# Patient Record
Sex: MALE | Race: WHITE | NOT HISPANIC OR LATINO | URBAN - METROPOLITAN AREA
[De-identification: names, ages, dates, MRNs, and addresses within clinical notes are randomized per-mention and may not be internally consistent; named-entity substitution may affect disease eponyms.]

---

## 2023-03-27 LAB
ALANINE AMINOTRANSFERASE (SGPT) (U/L) IN SER/PLAS: 43 U/L (ref 10–52)
ALBUMIN (G/DL) IN SER/PLAS: 4.3 G/DL (ref 3.4–5)
ALBUMIN (G/DL) IN SER/PLAS: 4.4 G/DL (ref 3.4–5)
ALKALINE PHOSPHATASE (U/L) IN SER/PLAS: 84 U/L (ref 33–120)
ANION GAP IN SER/PLAS: 15 MMOL/L (ref 10–20)
ASPARTATE AMINOTRANSFERASE (SGOT) (U/L) IN SER/PLAS: 25 U/L (ref 9–39)
BILIRUBIN DIRECT (MG/DL) IN SER/PLAS: 0.1 MG/DL (ref 0–0.3)
BILIRUBIN TOTAL (MG/DL) IN SER/PLAS: 0.5 MG/DL (ref 0–1.2)
CALCIUM (MG/DL) IN SER/PLAS: 9.4 MG/DL (ref 8.6–10.6)
CARBON DIOXIDE, TOTAL (MMOL/L) IN SER/PLAS: 26 MMOL/L (ref 21–32)
CHLORIDE (MMOL/L) IN SER/PLAS: 102 MMOL/L (ref 98–107)
CHOLESTEROL (MG/DL) IN SER/PLAS: 213 MG/DL (ref 0–199)
CHOLESTEROL IN HDL (MG/DL) IN SER/PLAS: 52.3 MG/DL
CHOLESTEROL/HDL RATIO: 4.1
CREATININE (MG/DL) IN SER/PLAS: 1.17 MG/DL (ref 0.5–1.3)
CREATININE (MG/DL) IN URINE: 82.7 MG/DL (ref 20–370)
ERYTHROCYTE DISTRIBUTION WIDTH (RATIO) BY AUTOMATED COUNT: 12.2 % (ref 11.5–14.5)
ERYTHROCYTE MEAN CORPUSCULAR HEMOGLOBIN CONCENTRATION (G/DL) BY AUTOMATED: 31.9 G/DL (ref 32–36)
ERYTHROCYTE MEAN CORPUSCULAR VOLUME (FL) BY AUTOMATED COUNT: 96 FL (ref 80–100)
ERYTHROCYTES (10*6/UL) IN BLOOD BY AUTOMATED COUNT: 4.98 X10E12/L (ref 4.5–5.9)
GFR MALE: 76 ML/MIN/1.73M2
GLUCOSE (MG/DL) IN SER/PLAS: 127 MG/DL (ref 74–99)
HEMATOCRIT (%) IN BLOOD BY AUTOMATED COUNT: 48 % (ref 41–52)
HEMOGLOBIN (G/DL) IN BLOOD: 15.3 G/DL (ref 13.5–17.5)
LDL: 132 MG/DL (ref 0–99)
LEUKOCYTES (10*3/UL) IN BLOOD BY AUTOMATED COUNT: 7.8 X10E9/L (ref 4.4–11.3)
MAGNESIUM (MG/DL) IN SER/PLAS: 1.83 MG/DL (ref 1.6–2.4)
NRBC (PER 100 WBCS) BY AUTOMATED COUNT: 0 /100 WBC (ref 0–0)
PHOSPHATE (MG/DL) IN SER/PLAS: 2.5 MG/DL (ref 2.5–4.9)
PLATELETS (10*3/UL) IN BLOOD AUTOMATED COUNT: 185 X10E9/L (ref 150–450)
POTASSIUM (MMOL/L) IN SER/PLAS: 4.8 MMOL/L (ref 3.5–5.3)
PROSTATE SPECIFIC AG (NG/ML) IN SER/PLAS: 0.64 NG/ML (ref 0–4)
PROTEIN (MG/DL) IN URINE: 11 MG/DL (ref 5–25)
PROTEIN TOTAL: 6.9 G/DL (ref 6.4–8.2)
PROTEIN/CREATININE (MG/MG) IN URINE: 0.13 MG/MG CREAT (ref 0–0.17)
SODIUM (MMOL/L) IN SER/PLAS: 138 MMOL/L (ref 136–145)
TACROLIMUS (NG/ML) IN BLOOD: 4.8 NG/ML (ref 2–15)
TRIGLYCERIDE (MG/DL) IN SER/PLAS: 146 MG/DL (ref 0–149)
UREA NITROGEN (MG/DL) IN SER/PLAS: 18 MG/DL (ref 6–23)
VLDL: 29 MG/DL (ref 0–40)

## 2023-10-13 ENCOUNTER — LAB (OUTPATIENT)
Dept: LAB | Facility: LAB | Age: 48
End: 2023-10-13
Payer: COMMERCIAL

## 2023-10-13 DIAGNOSIS — D84.9 IMMUNODEFICIENCY, UNSPECIFIED (MULTI): Primary | ICD-10-CM

## 2023-10-13 DIAGNOSIS — D84.9 IMMUNODEFICIENCY, UNSPECIFIED (MULTI): ICD-10-CM

## 2023-10-13 LAB
ALBUMIN SERPL BCP-MCNC: 4.2 G/DL (ref 3.4–5)
ANION GAP SERPL CALC-SCNC: 12 MMOL/L (ref 10–20)
BUN SERPL-MCNC: 21 MG/DL (ref 6–23)
CALCIUM SERPL-MCNC: 9.7 MG/DL (ref 8.6–10.6)
CHLORIDE SERPL-SCNC: 102 MMOL/L (ref 98–107)
CO2 SERPL-SCNC: 29 MMOL/L (ref 21–32)
CREAT SERPL-MCNC: 1.21 MG/DL (ref 0.5–1.3)
CREAT UR-MCNC: 105.7 MG/DL (ref 20–370)
GFR SERPL CREATININE-BSD FRML MDRD: 73 ML/MIN/1.73M*2
GLUCOSE SERPL-MCNC: 138 MG/DL (ref 74–99)
MAGNESIUM SERPL-MCNC: 1.79 MG/DL (ref 1.6–2.4)
PHOSPHATE SERPL-MCNC: 3.1 MG/DL (ref 2.5–4.9)
POTASSIUM SERPL-SCNC: 5.5 MMOL/L (ref 3.5–5.3)
SODIUM SERPL-SCNC: 137 MMOL/L (ref 136–145)
TACROLIMUS BLD-MCNC: 5.2 NG/ML

## 2023-10-13 PROCEDURE — 82570 ASSAY OF URINE CREATININE: CPT

## 2023-10-13 PROCEDURE — 80069 RENAL FUNCTION PANEL: CPT

## 2023-10-13 PROCEDURE — 80197 ASSAY OF TACROLIMUS: CPT

## 2023-10-13 PROCEDURE — 83735 ASSAY OF MAGNESIUM: CPT

## 2023-10-13 PROCEDURE — 36415 COLL VENOUS BLD VENIPUNCTURE: CPT

## 2023-10-18 ENCOUNTER — TELEPHONE (OUTPATIENT)
Dept: TRANSPLANT | Facility: HOSPITAL | Age: 48
End: 2023-10-18
Payer: COMMERCIAL

## 2023-10-18 DIAGNOSIS — Z94.0 KIDNEY REPLACED BY TRANSPLANT (HHS-HCC): ICD-10-CM

## 2023-10-18 RX ORDER — MULTIVITAMIN
1 TABLET ORAL DAILY
COMMUNITY

## 2023-10-18 RX ORDER — TACROLIMUS 0.5 MG/1
0.5 CAPSULE ORAL EVERY 12 HOURS
COMMUNITY
Start: 2023-02-11 | End: 2024-01-29 | Stop reason: SDUPTHER

## 2023-10-18 RX ORDER — TACROLIMUS 1 MG/1
2 CAPSULE, GELATIN COATED ORAL EVERY 12 HOURS
COMMUNITY
Start: 2020-12-03 | End: 2024-01-29

## 2023-10-18 RX ORDER — LISINOPRIL 20 MG/1
20 TABLET ORAL EVERY 12 HOURS
COMMUNITY
Start: 2022-01-05 | End: 2023-10-19 | Stop reason: SINTOL

## 2023-10-18 RX ORDER — PRAVASTATIN SODIUM 20 MG/1
20 TABLET ORAL NIGHTLY
COMMUNITY
Start: 2020-12-07 | End: 2024-03-28 | Stop reason: SDUPTHER

## 2023-10-18 RX ORDER — ASPIRIN 81 MG/1
81 TABLET ORAL DAILY
COMMUNITY

## 2023-10-18 RX ORDER — AMLODIPINE BESYLATE 2.5 MG/1
3 TABLET ORAL DAILY
COMMUNITY
Start: 2022-10-12 | End: 2023-10-19 | Stop reason: SDUPTHER

## 2023-10-18 RX ORDER — PREDNISONE 5 MG/1
5 TABLET ORAL DAILY
COMMUNITY
Start: 2020-11-20 | End: 2024-02-28 | Stop reason: SDUPTHER

## 2023-10-18 RX ORDER — MYCOPHENOLATE MOFETIL 500 MG/1
500 TABLET ORAL EVERY 12 HOURS
COMMUNITY
End: 2024-01-29

## 2023-10-19 ENCOUNTER — TELEPHONE (OUTPATIENT)
Dept: TRANSPLANT | Facility: HOSPITAL | Age: 48
End: 2023-10-19
Payer: COMMERCIAL

## 2023-10-19 DIAGNOSIS — Z94.0 KIDNEY REPLACED BY TRANSPLANT (HHS-HCC): ICD-10-CM

## 2023-10-19 RX ORDER — AMLODIPINE BESYLATE 2.5 MG/1
7.5 TABLET ORAL DAILY
Qty: 90 TABLET | Refills: 3 | Status: SHIPPED | OUTPATIENT
Start: 2023-10-19 | End: 2023-10-27 | Stop reason: SDUPTHER

## 2023-10-27 DIAGNOSIS — Z94.0 KIDNEY REPLACED BY TRANSPLANT (HHS-HCC): ICD-10-CM

## 2023-10-27 RX ORDER — AMLODIPINE BESYLATE 2.5 MG/1
7.5 TABLET ORAL DAILY
Qty: 90 TABLET | Refills: 3 | Status: SHIPPED | OUTPATIENT
Start: 2023-10-27 | End: 2024-02-28 | Stop reason: SDUPTHER

## 2023-11-21 ENCOUNTER — LAB (OUTPATIENT)
Dept: LAB | Facility: LAB | Age: 48
End: 2023-11-21
Payer: COMMERCIAL

## 2023-11-21 DIAGNOSIS — Z94.0 KIDNEY REPLACED BY TRANSPLANT (HHS-HCC): ICD-10-CM

## 2023-11-21 LAB
CREAT UR-MCNC: 84.5 MG/DL (ref 20–370)
ERYTHROCYTE [DISTWIDTH] IN BLOOD BY AUTOMATED COUNT: 11.5 % (ref 11.5–14.5)
HCT VFR BLD AUTO: 47.7 % (ref 41–52)
HGB BLD-MCNC: 15.9 G/DL (ref 13.5–17.5)
MAGNESIUM SERPL-MCNC: 1.58 MG/DL (ref 1.6–2.4)
MCH RBC QN AUTO: 31.1 PG (ref 26–34)
MCHC RBC AUTO-ENTMCNC: 33.3 G/DL (ref 32–36)
MCV RBC AUTO: 93 FL (ref 80–100)
NRBC BLD-RTO: 0 /100 WBCS (ref 0–0)
PLATELET # BLD AUTO: 165 X10*3/UL (ref 150–450)
PROT UR-ACNC: 34 MG/DL (ref 5–25)
PROT/CREAT UR: 0.4 MG/MG CREAT (ref 0–0.17)
RBC # BLD AUTO: 5.11 X10*6/UL (ref 4.5–5.9)
TACROLIMUS BLD-MCNC: 7.6 NG/ML
WBC # BLD AUTO: 4.3 X10*3/UL (ref 4.4–11.3)

## 2023-11-21 PROCEDURE — 82570 ASSAY OF URINE CREATININE: CPT

## 2023-11-21 PROCEDURE — 80197 ASSAY OF TACROLIMUS: CPT

## 2023-11-21 PROCEDURE — 83735 ASSAY OF MAGNESIUM: CPT

## 2023-11-21 PROCEDURE — 85027 COMPLETE CBC AUTOMATED: CPT

## 2023-11-21 PROCEDURE — 36415 COLL VENOUS BLD VENIPUNCTURE: CPT

## 2023-11-21 PROCEDURE — 84156 ASSAY OF PROTEIN URINE: CPT

## 2023-11-22 LAB
CMV DNA SERPL NAA+PROBE-LOG IU: NORMAL {LOG_IU}/ML
LABORATORY COMMENT REPORT: NOT DETECTED

## 2024-01-16 ENCOUNTER — TELEPHONE (OUTPATIENT)
Dept: TRANSPLANT | Facility: HOSPITAL | Age: 49
End: 2024-01-16
Payer: COMMERCIAL

## 2024-01-16 DIAGNOSIS — Z94.0 KIDNEY REPLACED BY TRANSPLANT (HHS-HCC): ICD-10-CM

## 2024-01-29 RX ORDER — TACROLIMUS 0.5 MG/1
0.5 CAPSULE ORAL EVERY 12 HOURS
Qty: 180 CAPSULE | Refills: 3 | Status: SHIPPED | OUTPATIENT
Start: 2024-01-29 | End: 2024-02-28 | Stop reason: SDUPTHER

## 2024-01-29 RX ORDER — MYCOPHENOLATE MOFETIL 500 MG/1
1000 TABLET ORAL EVERY 12 HOURS SCHEDULED
Qty: 360 TABLET | Refills: 3 | Status: SHIPPED | OUTPATIENT
Start: 2024-01-29 | End: 2024-02-28 | Stop reason: SDUPTHER

## 2024-01-29 RX ORDER — TACROLIMUS 1 MG/1
2 CAPSULE ORAL EVERY 12 HOURS
Qty: 360 CAPSULE | Refills: 3 | Status: SHIPPED | OUTPATIENT
Start: 2024-01-29 | End: 2024-02-28 | Stop reason: SDUPTHER

## 2024-02-26 ENCOUNTER — LAB (OUTPATIENT)
Dept: LAB | Facility: LAB | Age: 49
End: 2024-02-26
Payer: COMMERCIAL

## 2024-02-26 DIAGNOSIS — Z94.0 KIDNEY REPLACED BY TRANSPLANT (HHS-HCC): ICD-10-CM

## 2024-02-26 LAB
CREAT UR-MCNC: 99.8 MG/DL (ref 20–370)
ERYTHROCYTE [DISTWIDTH] IN BLOOD BY AUTOMATED COUNT: 11.6 % (ref 11.5–14.5)
HCT VFR BLD AUTO: 48.9 % (ref 41–52)
HGB BLD-MCNC: 16.4 G/DL (ref 13.5–17.5)
MAGNESIUM SERPL-MCNC: 1.58 MG/DL (ref 1.6–2.4)
MCH RBC QN AUTO: 30.7 PG (ref 26–34)
MCHC RBC AUTO-ENTMCNC: 33.5 G/DL (ref 32–36)
MCV RBC AUTO: 92 FL (ref 80–100)
NRBC BLD-RTO: 0 /100 WBCS (ref 0–0)
PLATELET # BLD AUTO: 213 X10*3/UL (ref 150–450)
PROT UR-ACNC: 21 MG/DL (ref 5–25)
PROT/CREAT UR: 0.21 MG/MG CREAT (ref 0–0.17)
RBC # BLD AUTO: 5.34 X10*6/UL (ref 4.5–5.9)
TACROLIMUS BLD-MCNC: 5.6 NG/ML
WBC # BLD AUTO: 5.5 X10*3/UL (ref 4.4–11.3)

## 2024-02-26 PROCEDURE — 82570 ASSAY OF URINE CREATININE: CPT

## 2024-02-26 PROCEDURE — 36415 COLL VENOUS BLD VENIPUNCTURE: CPT

## 2024-02-26 PROCEDURE — 80197 ASSAY OF TACROLIMUS: CPT

## 2024-02-26 PROCEDURE — 85027 COMPLETE CBC AUTOMATED: CPT

## 2024-02-26 PROCEDURE — 84156 ASSAY OF PROTEIN URINE: CPT

## 2024-02-26 PROCEDURE — 83735 ASSAY OF MAGNESIUM: CPT

## 2024-02-28 ENCOUNTER — LAB (OUTPATIENT)
Dept: LAB | Facility: LAB | Age: 49
End: 2024-02-28
Payer: COMMERCIAL

## 2024-02-28 ENCOUNTER — OFFICE VISIT (OUTPATIENT)
Dept: TRANSPLANT | Facility: CLINIC | Age: 49
End: 2024-02-28
Payer: COMMERCIAL

## 2024-02-28 VITALS
HEART RATE: 89 BPM | BODY MASS INDEX: 31.11 KG/M2 | OXYGEN SATURATION: 97 % | WEIGHT: 235.8 LBS | SYSTOLIC BLOOD PRESSURE: 163 MMHG | DIASTOLIC BLOOD PRESSURE: 109 MMHG | TEMPERATURE: 97.1 F

## 2024-02-28 DIAGNOSIS — M85.80 OSTEOPENIA, UNSPECIFIED LOCATION: ICD-10-CM

## 2024-02-28 DIAGNOSIS — R07.89 CHEST DISCOMFORT: ICD-10-CM

## 2024-02-28 DIAGNOSIS — I10 ESSENTIAL HYPERTENSION: ICD-10-CM

## 2024-02-28 DIAGNOSIS — E21.3 HYPERPARATHYROIDISM (MULTI): ICD-10-CM

## 2024-02-28 DIAGNOSIS — E55.9 VITAMIN D DEFICIENCY: ICD-10-CM

## 2024-02-28 DIAGNOSIS — Z94.0 KIDNEY REPLACED BY TRANSPLANT (HHS-HCC): Primary | ICD-10-CM

## 2024-02-28 DIAGNOSIS — R06.09 DOE (DYSPNEA ON EXERTION): ICD-10-CM

## 2024-02-28 DIAGNOSIS — I25.10 CORONARY ARTERY DISEASE INVOLVING NATIVE CORONARY ARTERY OF NATIVE HEART WITHOUT ANGINA PECTORIS: ICD-10-CM

## 2024-02-28 DIAGNOSIS — Z94.0 KIDNEY REPLACED BY TRANSPLANT (HHS-HCC): ICD-10-CM

## 2024-02-28 LAB
ALBUMIN SERPL BCP-MCNC: 4.3 G/DL (ref 3.4–5)
ANION GAP SERPL CALC-SCNC: 16 MMOL/L (ref 10–20)
BUN SERPL-MCNC: 14 MG/DL (ref 6–23)
CALCIUM SERPL-MCNC: 9.5 MG/DL (ref 8.6–10.3)
CHLORIDE SERPL-SCNC: 100 MMOL/L (ref 98–107)
CO2 SERPL-SCNC: 23 MMOL/L (ref 21–32)
CREAT SERPL-MCNC: 1.09 MG/DL (ref 0.5–1.3)
EGFRCR SERPLBLD CKD-EPI 2021: 83 ML/MIN/1.73M*2
GLUCOSE SERPL-MCNC: 137 MG/DL (ref 74–99)
PHOSPHATE SERPL-MCNC: 3.2 MG/DL (ref 2.5–4.9)
POTASSIUM SERPL-SCNC: 4.4 MMOL/L (ref 3.5–5.3)
SODIUM SERPL-SCNC: 135 MMOL/L (ref 136–145)

## 2024-02-28 PROCEDURE — 80069 RENAL FUNCTION PANEL: CPT

## 2024-02-28 PROCEDURE — 99214 OFFICE O/P EST MOD 30 MIN: CPT

## 2024-02-28 PROCEDURE — 3077F SYST BP >= 140 MM HG: CPT | Performed by: INTERNAL MEDICINE

## 2024-02-28 PROCEDURE — 36415 COLL VENOUS BLD VENIPUNCTURE: CPT

## 2024-02-28 PROCEDURE — 3080F DIAST BP >= 90 MM HG: CPT | Performed by: INTERNAL MEDICINE

## 2024-02-28 RX ORDER — TACROLIMUS 1 MG/1
2 CAPSULE ORAL EVERY 12 HOURS
Qty: 120 CAPSULE | Refills: 11 | Status: SHIPPED | OUTPATIENT
Start: 2024-02-28 | End: 2025-02-27

## 2024-02-28 RX ORDER — MYCOPHENOLATE MOFETIL 500 MG/1
1000 TABLET ORAL EVERY 12 HOURS SCHEDULED
Qty: 120 TABLET | Refills: 11 | Status: SHIPPED | OUTPATIENT
Start: 2024-02-28 | End: 2025-02-27

## 2024-02-28 RX ORDER — AMLODIPINE BESYLATE 10 MG/1
10 TABLET ORAL DAILY
Qty: 90 TABLET | Refills: 3 | Status: SHIPPED | OUTPATIENT
Start: 2024-02-28 | End: 2025-02-27

## 2024-02-28 RX ORDER — TACROLIMUS 0.5 MG/1
0.5 CAPSULE ORAL EVERY 12 HOURS
Qty: 60 CAPSULE | Refills: 11 | Status: SHIPPED | OUTPATIENT
Start: 2024-02-28 | End: 2025-02-27

## 2024-02-28 RX ORDER — PREDNISONE 5 MG/1
5 TABLET ORAL DAILY
Qty: 90 TABLET | Refills: 3 | Status: SHIPPED | OUTPATIENT
Start: 2024-02-28 | End: 2025-02-27

## 2024-02-28 ASSESSMENT — PAIN SCALES - GENERAL: PAINLEVEL: 0-NO PAIN

## 2024-02-28 NOTE — PROGRESS NOTES
TRANSPLANT NEPHROLOGY :   OUTPATIENT CLINIC NOTE      SERVICE DATE : 02/28/2024    REASON FOR VISIT/CHIEF COMPLAINT:  S/P  TRANSPLANT SURGERY  IMMUNOSUPPRESSIVE MEDICATION MANAGEMENT  BLOOD PRESSURE MANAGEMENT    HPI:    Mr. Vines is a 48 y.o. male with past medical history significant for hypertension, hyperlipiemia, diverticular disease and End Stage Renal Disease secondary to APKD who underwent a living related renal transplant on 12/6/2001. Donor was his brother, identical match. Patient is here to follow up S/P kidney transplant. His regular nephrologist, DR. Braun, retired and he would like to establish care with  [facility].     Patient had COVID in 11/2021 and was diagnosed with testicular cancer in 12/2020 s/p surgery.     Patient is here for follow up s/p kidney transplant.    Patient is doing well overall. He complained of occasional chest discomfort, unsure if related to position/exercise/musculoskeleton and dyspnea on exertion. BP has been elevated.     Normal urination and bowel movement. Normal gait and no weakness of arms/legs. No cough, runny nose, sore throat, cold symptoms, or rash. No hearing loss. Normal vision.No problems with his sleep, mood and function. No recent infection, hospitalization, surgery or ER visits.      ROS:  Review of  14 systems was performed system by system. See HPI. Otherwise, the symptoms were negative.    PAST MEDICAL HISTORY:  No past medical history on file.     PAST SURGICAL HISTORY:  No past surgical history on file.     SOCIAL HISTORY:  Social History     Socioeconomic History    Marital status:      Spouse name: Not on file    Number of children: Not on file    Years of education: Not on file    Highest education level: Not on file   Occupational History    Not on file   Tobacco Use    Smoking status: Not on file    Smokeless tobacco: Not on file   Substance and Sexual Activity    Alcohol use: Not on file    Drug use: Not on file     Sexual activity: Not on file   Other Topics Concern    Not on file   Social History Narrative    Not on file     Social Determinants of Health     Financial Resource Strain: Not on file   Food Insecurity: Not on file   Transportation Needs: Not on file   Physical Activity: Not on file   Stress: Not on file   Social Connections: Not on file   Intimate Partner Violence: Not on file   Housing Stability: Not on file       FAMILY HISTORY:  No family history on file.    MEDICATION LIST:  Current Outpatient Medications   Medication Instructions    amLODIPine (NORVASC) 10 mg, oral, Daily    aspirin 81 mg, oral, Daily    multivitamin tablet 1 tablet, oral, Daily    mycophenolate (CELLCEPT) 1,000 mg, oral, Every 12 hours scheduled    pravastatin (PRAVACHOL) 20 mg, oral, Nightly    predniSONE (DELTASONE) 5 mg, oral, Daily    tacrolimus (PROGRAF) 0.5 mg, oral, Every 12 hours, Total dose of 2.5mg Q 12 hrs.    tacrolimus (PROGRAF) 2 mg, oral, Every 12 hours       ALLERGY  No Known Allergies    PHYSICAL EXAM:    Visit Vitals  BP (!) 163/109   Pulse 89   Temp 36.2 °C (97.1 °F) (Temporal)   Wt 107 kg (235 lb 12.8 oz)   SpO2 97%   BMI 31.11 kg/m²   BSA 2.35 m²          Vital signs - reviewed. Acceptable BP at this office visit.   General Appearance - NAD, Good speech, oriented and alert  HEENT - Supple. Not pale. No jaundice. No cervical lymphadenopathy. Pharynx and tonsils are not injected.  CVS - RRR. Normal S1/S2. No murmur, click , rub or gallop  Lungs- clear to auscultation bilaterally  Abdomen - soft , not tender, no guarding, no rigidity. No hepatosplenomegaly. Normal bowel sounds. No masses and ascites. S/P Kidney transplant .  Transplanted kidney is not tender.   Musculoskeletal /Extremities - no edema. Full ROM. No joint tenderness.   Neuro/Psych - appropriate mood and affect. Motor power V/V all extremities. CN I -XII were grossly intact.  Skin - No visible rash      LABS:    Lab Results   Component Value Date    WBC 5.5  02/26/2024    HGB 16.4 02/26/2024    HCT 48.9 02/26/2024     02/26/2024    CHOL 213 (H) 03/27/2023    TRIG 146 03/27/2023    HDL 52.3 03/27/2023    ALT 43 03/27/2023    AST 25 03/27/2023     10/13/2023    K 5.5 (H) 10/13/2023     10/13/2023    CREATININE 1.21 10/13/2023    BUN 21 10/13/2023    CO2 29 10/13/2023    TSH 1.56 09/14/2022    PSA 0.64 03/27/2023    HGBA1C 6.6 (A) 01/24/2022     par    ASSESSMENT AND PLAN:    Mr. Vines is a 48 y.o. male  who is here for follow up s/p kidney transplant.    TRANSPLANT DATE: 12/6/2001 (Kidney)      1. ESRD S/P kidney transplant   - Creatinine last check was :  Lab Results   Component Value Date    CREATININE 1.21 10/13/2023     Creatinine clearance cannot be calculated (Patient's most recent lab result is older than the maximum 7 days allowed.)    - RFP from 2/26 was NOT processed due to lab error. He will go to lab today    -Random urine protein/creatinine ratio is 0.2 Normal    -Ensure adequate hydration  - Avoid nephrotoxic medications, NSAIDs, and IV contrast.    2. Immunosuppression  -Tacrolimus level last check was 5.6 at goal  -Continue current immunosuppression regimen.    3. Electrolytes  Lab Results   Component Value Date    GLUCOSE 138 (H) 10/13/2023    CALCIUM 9.7 10/13/2023     10/13/2023    K 5.5 (H) 10/13/2023    CO2 29 10/13/2023     10/13/2023    BUN 21 10/13/2023    CREATININE 1.21 10/13/2023     -Acceptable from last lab drawn    4. Hypertension  Blood Pressures         2/28/2024  0829             BP: 163/109          -Home  BP had been acceptable  -Encourage to monitor home BP  -Continue current anti hypertensive medication    5. Bone Mineral Disease/Osteoporosis  Lab Results   Component Value Date    PTH 51.4 01/24/2022    CALCIUM 9.7 10/13/2023    PHOS 3.1 10/13/2023    VITD25 48 01/24/2022     -check VIT D, PTH with next lab  - Consider DEXA every 2-3 years , defer to PCP    6.Anemia  Lab Results   Component Value  Date    WBC 5.5 02/26/2024    HGB 16.4 02/26/2024    HCT 48.9 02/26/2024    MCV 92 02/26/2024     02/26/2024     -asymptomatic  - Continue to monitor  -check iron studies and ferritin. Will consider RAJEEV as needed.  - No indications for blood transfusion     7.Health maintenance and vaccination  - Flu shot during flu season annually  - Cancer screening is up to date per the patient    Lab :  RFP today    Standing lab orders were renewed [ Routine transplant lab ( CBC, RFP, and anti-rejection trough level ) every 3 months  Additional labs:  VIT D, PTH with next lab   UPC per protocol.    Additional Plan :  Increase amlodipine  REFILLED all meds except pravastatin and ASA given he gets it from PCP/OTC.  CT Cardiac calcsium score  Echo for GAYTAN, Chest discomfort and Worsening HTN  DEXA to follow up osteopenia    RTC 12 month(s)    Lee Leavitt    Transplant Nephrology

## 2024-02-29 LAB
CMV DNA SERPL NAA+PROBE-LOG IU: NORMAL {LOG_IU}/ML
LABORATORY COMMENT REPORT: NOT DETECTED

## 2024-03-13 ENCOUNTER — HOSPITAL ENCOUNTER (OUTPATIENT)
Dept: CARDIOLOGY | Facility: CLINIC | Age: 49
Discharge: HOME | End: 2024-03-13
Payer: COMMERCIAL

## 2024-03-13 DIAGNOSIS — Z94.0 KIDNEY REPLACED BY TRANSPLANT (HHS-HCC): ICD-10-CM

## 2024-03-13 DIAGNOSIS — R07.89 CHEST DISCOMFORT: ICD-10-CM

## 2024-03-13 DIAGNOSIS — E55.9 VITAMIN D DEFICIENCY: ICD-10-CM

## 2024-03-13 DIAGNOSIS — M85.80 OSTEOPENIA, UNSPECIFIED LOCATION: ICD-10-CM

## 2024-03-13 DIAGNOSIS — I25.10 CORONARY ARTERY DISEASE INVOLVING NATIVE CORONARY ARTERY OF NATIVE HEART WITHOUT ANGINA PECTORIS: ICD-10-CM

## 2024-03-13 DIAGNOSIS — I10 ESSENTIAL HYPERTENSION: ICD-10-CM

## 2024-03-13 DIAGNOSIS — R06.09 DOE (DYSPNEA ON EXERTION): ICD-10-CM

## 2024-03-13 DIAGNOSIS — E21.3 HYPERPARATHYROIDISM (MULTI): ICD-10-CM

## 2024-03-13 LAB
EJECTION FRACTION APICAL 4 CHAMBER: 57.9
EJECTION FRACTION: 54 %
LEFT ATRIUM VOLUME AREA LENGTH INDEX BSA: 22.6 ML/M2
LEFT VENTRICLE INTERNAL DIMENSION DIASTOLE: 4.62 CM (ref 3.5–6)
LEFT VENTRICULAR OUTFLOW TRACT DIAMETER: 2.41 CM
MITRAL VALVE E/A RATIO: 0.62
MITRAL VALVE E/E' RATIO: 5.56
RIGHT VENTRICLE FREE WALL PEAK S': 7 CM/S
TRICUSPID ANNULAR PLANE SYSTOLIC EXCURSION: 1.4 CM

## 2024-03-13 PROCEDURE — 93321 DOPPLER ECHO F-UP/LMTD STD: CPT | Performed by: INTERNAL MEDICINE

## 2024-03-13 PROCEDURE — 93325 DOPPLER ECHO COLOR FLOW MAPG: CPT | Performed by: INTERNAL MEDICINE

## 2024-03-13 PROCEDURE — 93308 TTE F-UP OR LMTD: CPT | Performed by: INTERNAL MEDICINE

## 2024-03-13 PROCEDURE — 93325 DOPPLER ECHO COLOR FLOW MAPG: CPT

## 2024-03-13 PROCEDURE — 2500000004 HC RX 250 GENERAL PHARMACY W/ HCPCS (ALT 636 FOR OP/ED): Performed by: INTERNAL MEDICINE

## 2024-03-13 RX ADMIN — PERFLUTREN 4 ML OF DILUTION: 6.52 INJECTION, SUSPENSION INTRAVENOUS at 08:44

## 2024-03-27 ENCOUNTER — OFFICE VISIT (OUTPATIENT)
Dept: TRANSPLANT | Facility: HOSPITAL | Age: 49
End: 2024-03-27
Payer: COMMERCIAL

## 2024-03-27 DIAGNOSIS — I10 ESSENTIAL HYPERTENSION: ICD-10-CM

## 2024-03-27 DIAGNOSIS — I71.21 ANEURYSM OF ASCENDING AORTA WITHOUT RUPTURE (CMS-HCC): Primary | ICD-10-CM

## 2024-03-27 DIAGNOSIS — E78.5 DYSLIPIDEMIA: ICD-10-CM

## 2024-03-27 PROCEDURE — 99214 OFFICE O/P EST MOD 30 MIN: CPT | Performed by: INTERNAL MEDICINE

## 2024-03-27 PROCEDURE — 99204 OFFICE O/P NEW MOD 45 MIN: CPT | Performed by: INTERNAL MEDICINE

## 2024-03-27 PROCEDURE — 1036F TOBACCO NON-USER: CPT | Performed by: INTERNAL MEDICINE

## 2024-03-27 NOTE — PATIENT INSTRUCTIONS
It was a pleasure seeing you today. Please contact myself or my team with any questions.     To reach Dr. Thurman' office please call [phone] ([place]).   Fax: [phone]   To schedule an appointment call [phone]     If you have any questions or need cardiac medication refills, please call the Heart Failure office at [phone], option [phone]. You may also contact the  Heart Failure Nursing team via email at [email or web address] (Please include your name and date of birth).         1) Increase pravastatin to 40 mg once a day  2) Start carvedilol 6.25 mg twice day  3) Monitor your blood pressure and call or email in 2 weeks  4) Follow up in 6 months at /[facility]      Pharmacy:  [address]

## 2024-03-27 NOTE — LETTER
March 27, 2024     Lee Leavitt MD  [id number]  Department Of Medicine-Nephrology  [facility] [address]    Patient: Aaron Vines   YOB: 1975   Date of Visit: 3/27/2024       Dear Dr. Lee eLavitt MD:    Thank you for referring Aaron Vines to me for evaluation. Below are my notes for this consultation.  If you have questions, please do not hesitate to call me. I look forward to following your patient along with you.       Sincerely,     Roel Thurman,       CC: No Recipients  ______________________________________________________________________________________        [facility] Advanced Heart Failure Clinic  Primary Care Physician: Chris Nieves MD  Referring Provider: Dagmar      Date of Visit: 03/27/2024  2:40 PM EDT  Location of visit: [facility]     HPI:   Mr. Vines is a 48M with a PMHx sig for HTN, dyslipidemia, and PCKD s/p DDKT (2001) who was referred to the Advanced Heart Failure clinic after a recent cardiac evaluation by his transplant nephrologist.     Here with his wife today. Recently seen by transplant nephrology where cardiac testing including an echocardiogram was performed which noted aortic root dilatation and a trabeculated LV apex.     At the current time he denies chest pain, pressure, SOB/GAYTAN, PND, orthopnea, LE edema, palpitations, light headedness, dizziness, or syncope.     He does monitor his BP at home and reports that it has been elevated (-160, DBP ). He has both an upper arm cuff and wrist cuff.       PMHx:  HTN, dyslipidemia, PCKD s/p DDKT (2001)    SocHx:   lives in [place]  Former chewing tobacco (quit 2016)  EtOH (~8-9 shots of bourbon with coke/day)  Denies illicits    FamHx:  Mom with PCKD        Current Outpatient Medications   Medication Sig Dispense Refill   • amLODIPine (Norvasc) 10 mg tablet Take 1 tablet (10 mg) by mouth once daily. 90 tablet 3   • aspirin 81 mg EC tablet  Take 1 tablet (81 mg) by mouth once daily.     • multivitamin tablet Take 1 tablet by mouth once daily.     • mycophenolate (Cellcept) 500 mg tablet Take 2 tablets (1,000 mg) by mouth every 12 hours. 120 tablet 11   • pravastatin (Pravachol) 20 mg tablet Take 1 tablet (20 mg) by mouth once daily at bedtime.     • predniSONE (Deltasone) 5 mg tablet Take 1 tablet (5 mg) by mouth once daily. 90 tablet 3   • tacrolimus (Prograf) 0.5 mg capsule Take 1 capsule (0.5 mg) by mouth every 12 hours. Total dose of 2.5mg Q 12 hrs. 60 capsule 11   • tacrolimus (Prograf) 1 mg capsule Take 2 capsules (2 mg) by mouth every 12 hours. 120 capsule 11     No current facility-administered medications for this visit.       No Known Allergies      Physical Exam:  On exam Mr. Vines appears his stated age, is alert and oriented x3, and in no acute distress. His sclera are anicteric and his oropharynx has moist mucous membranes. His neck is supple and without thyromegaly. The JVP is ~5 cm of water above the right atrium. His cardiac exam has regular rhythm, normal S1, S2. No S3/4. There are no murmurs. His lungs are clear to auscultation bilaterally and there is no dullness to percussion. His abdomen is soft, nontender with normoactive bowel sounds. There is no HJR. The extremities are warm and without edema. The skin is dry. There is no rash present. The distal pulses are 2-3+ in all four extremities. His mood and affect are appropriate for todays encounter.       Cardiac Labs/Diagnostics:    Lab Results   Component Value Date    CREATININE 1.09 02/28/2024    BUN 14 02/28/2024     (L) 02/28/2024    K 4.4 02/28/2024     02/28/2024    CO2 23 02/28/2024        Recent Labs     03/27/23  0644 09/14/22  0640 01/24/22  0639   CHOL 213* 222* 221*   LDLF 132* 130* 135*   HDL 52.3 47.7 51.0   TRIG 146 224* 173*       Echo (3/13/24):  1. Poorly visualized anatomical structures due to suboptimal image quality.  2. Left ventricular  systolic function is low normal with a 50-55% estimated ejection fraction.  3. There is mildly reduced right ventricular systolic function.  4. No prior echocardiogram available for comparison.  5. Trabeculated LV apex.  6. Ordering MD notified of findings at the time of reporting.      Impression/Plan:  Mr. Vines is a 48M with a PMHx sig for HTN, dyslipidemia, and PCKD s/p DDKT (2001) who was referred to the Advanced Heart Failure clinic after a recent cardiac evaluation by his transplant nephrologist.     1) Aortic root dilatation (4.4 cm at Ao sinus)  Suspect in the setting HTN. Currently with HTN despite his current dose of CCB. Will add BB given his root dilatation.   -start carvedilol 6.25 mg bid  -will monitor BP and report vitals in 2 weeks  -encouraged a significant reduction in his daily etOH intake  -will repeat imaging once BP is controlled    2) HTN  BP remains elevated.   -c/w amlodipine 10 mg daily  -start carvedilol 6.25 mg bid    3) Dyslipidemia  LDL remains borderline elevated. Dose increased from 10 to 20 recently, He is ordered for a repeat in 6 months. He is scheduled for a CT cardiac score.  -increase pravastatin to 40 mg daily as increase by 10 likely to make little difference in his LDL; pending repeat will consider transition to a more potent statin    4) LV trabeculations  Will assess either either CT or cMRI once his BP is controlled as noted above.        F/U: 6 months at /[facility]      [place],  Thank you for referring Mr. Vines to the  Advanced Heart Failure Clinic. Please let me know if you have any questions.       ____________________________________________________________  Roel Thurman DO  Section of Advanced Heart Failure and Cardiac Transplantation  Division of Cardiovascular Medicine  [facility]  [facility]

## 2024-03-27 NOTE — PROGRESS NOTES
[facility] Advanced Heart Failure Clinic  Primary Care Physician: Chris Nieves MD  Referring Provider: Dagmar      Date of Visit: 03/27/2024  2:40 PM EDT  Location of visit: [facility]     HPI:   Mr. Vines is a 48M with a PMHx sig for HTN, dyslipidemia, and PCKD s/p DDKT (2001) who was referred to the Advanced Heart Failure clinic after a recent cardiac evaluation by his transplant nephrologist.     Here with his wife today. Recently seen by transplant nephrology where cardiac testing including an echocardiogram was performed which noted aortic root dilatation and a trabeculated LV apex.     At the current time he denies chest pain, pressure, SOB/GAYTAN, PND, orthopnea, LE edema, palpitations, light headedness, dizziness, or syncope.     He does monitor his BP at home and reports that it has been elevated (-160, DBP ). He has both an upper arm cuff and wrist cuff.       PMHx:  HTN, dyslipidemia, PCKD s/p DDKT (2001)    SocHx:   lives in [place]  Former chewing tobacco (quit 2016)  EtOH (~8-9 shots of bourbon with coke/day)  Denies illicits    FamHx:  Mom with PCKD        Current Outpatient Medications   Medication Sig Dispense Refill    amLODIPine (Norvasc) 10 mg tablet Take 1 tablet (10 mg) by mouth once daily. 90 tablet 3    aspirin 81 mg EC tablet Take 1 tablet (81 mg) by mouth once daily.      multivitamin tablet Take 1 tablet by mouth once daily.      mycophenolate (Cellcept) 500 mg tablet Take 2 tablets (1,000 mg) by mouth every 12 hours. 120 tablet 11    pravastatin (Pravachol) 20 mg tablet Take 1 tablet (20 mg) by mouth once daily at bedtime.      predniSONE (Deltasone) 5 mg tablet Take 1 tablet (5 mg) by mouth once daily. 90 tablet 3    tacrolimus (Prograf) 0.5 mg capsule Take 1 capsule (0.5 mg) by mouth every 12 hours. Total dose of 2.5mg Q 12 hrs. 60 capsule 11    tacrolimus (Prograf) 1 mg capsule Take 2 capsules (2 mg) by mouth every 12 hours. 120 capsule  11     No current facility-administered medications for this visit.       No Known Allergies      Physical Exam:  On exam Mr. Vines appears his stated age, is alert and oriented x3, and in no acute distress. His sclera are anicteric and his oropharynx has moist mucous membranes. His neck is supple and without thyromegaly. The JVP is ~5 cm of water above the right atrium. His cardiac exam has regular rhythm, normal S1, S2. No S3/4. There are no murmurs. His lungs are clear to auscultation bilaterally and there is no dullness to percussion. His abdomen is soft, nontender with normoactive bowel sounds. There is no HJR. The extremities are warm and without edema. The skin is dry. There is no rash present. The distal pulses are 2-3+ in all four extremities. His mood and affect are appropriate for todays encounter.       Cardiac Labs/Diagnostics:    Lab Results   Component Value Date    CREATININE 1.09 02/28/2024    BUN 14 02/28/2024     (L) 02/28/2024    K 4.4 02/28/2024     02/28/2024    CO2 23 02/28/2024        Recent Labs     03/27/23  0644 09/14/22  0640 01/24/22  0639   CHOL 213* 222* 221*   LDLF 132* 130* 135*   HDL 52.3 47.7 51.0   TRIG 146 224* 173*       Echo (3/13/24):  1. Poorly visualized anatomical structures due to suboptimal image quality.  2. Left ventricular systolic function is low normal with a 50-55% estimated ejection fraction.  3. There is mildly reduced right ventricular systolic function.  4. No prior echocardiogram available for comparison.  5. Trabeculated LV apex.  6. Ordering MD notified of findings at the time of reporting.      Impression/Plan:  Mr. Vines is a 48M with a PMHx sig for HTN, dyslipidemia, and PCKD s/p DDKT (2001) who was referred to the Advanced Heart Failure clinic after a recent cardiac evaluation by his transplant nephrologist.     1) Aortic root dilatation (4.4 cm at Ao sinus)  Suspect in the setting HTN. Currently with HTN despite his current dose  of CCB. Will add BB given his root dilatation.   -start carvedilol 6.25 mg bid  -will monitor BP and report vitals in 2 weeks  -encouraged a significant reduction in his daily etOH intake  -will repeat imaging once BP is controlled    2) HTN  BP remains elevated.   -c/w amlodipine 10 mg daily  -start carvedilol 6.25 mg bid    3) Dyslipidemia  LDL remains borderline elevated. Dose increased from 10 to 20 recently, He is ordered for a repeat in 6 months. He is scheduled for a CT cardiac score.  -increase pravastatin to 40 mg daily as increase by 10 likely to make little difference in his LDL; pending repeat will consider transition to a more potent statin    4) LV trabeculations  Will assess either either CT or cMRI once his BP is controlled as noted above.        F/U: 6 months at /[facility],  Thank you for referring Mr. Vines to the  Advanced Heart Failure Clinic. Please let me know if you have any questions.       ____________________________________________________________  Roel Thurman,   Section of Advanced Heart Failure and Cardiac Transplantation  Division of Cardiovascular Medicine  [facility]  [facility]

## 2024-03-28 DIAGNOSIS — I10 ESSENTIAL HYPERTENSION: ICD-10-CM

## 2024-03-28 DIAGNOSIS — I25.10 CORONARY ARTERY DISEASE INVOLVING NATIVE CORONARY ARTERY OF NATIVE HEART WITHOUT ANGINA PECTORIS: Primary | ICD-10-CM

## 2024-03-28 RX ORDER — CARVEDILOL 6.25 MG/1
6.25 TABLET ORAL
Qty: 180 TABLET | Refills: 1 | Status: SHIPPED | OUTPATIENT
Start: 2024-03-28 | End: 2024-05-14 | Stop reason: SDUPTHER

## 2024-03-28 RX ORDER — PRAVASTATIN SODIUM 40 MG/1
40 TABLET ORAL NIGHTLY
Qty: 90 TABLET | Refills: 3 | Status: SHIPPED | OUTPATIENT
Start: 2024-03-28 | End: 2024-05-09 | Stop reason: SDUPTHER

## 2024-05-06 ENCOUNTER — LAB (OUTPATIENT)
Dept: LAB | Facility: LAB | Age: 49
End: 2024-05-06
Payer: COMMERCIAL

## 2024-05-06 DIAGNOSIS — I10 ESSENTIAL HYPERTENSION: ICD-10-CM

## 2024-05-06 DIAGNOSIS — I10 ESSENTIAL (PRIMARY) HYPERTENSION: Primary | ICD-10-CM

## 2024-05-06 DIAGNOSIS — E55.9 VITAMIN D DEFICIENCY: ICD-10-CM

## 2024-05-06 DIAGNOSIS — C62.90 MALIGNANT NEOPLASM OF UNSPECIFIED TESTIS, UNSPECIFIED WHETHER DESCENDED OR UNDESCENDED (MULTI): ICD-10-CM

## 2024-05-06 DIAGNOSIS — Z94.0 KIDNEY REPLACED BY TRANSPLANT (HHS-HCC): ICD-10-CM

## 2024-05-06 DIAGNOSIS — E21.3 HYPERPARATHYROIDISM (MULTI): ICD-10-CM

## 2024-05-06 DIAGNOSIS — E78.2 MIXED HYPERLIPIDEMIA: ICD-10-CM

## 2024-05-06 LAB
25(OH)D3 SERPL-MCNC: 27 NG/ML (ref 30–100)
ALBUMIN SERPL BCP-MCNC: 4.2 G/DL (ref 3.4–5)
ALP SERPL-CCNC: 77 U/L (ref 33–120)
ALT SERPL W P-5'-P-CCNC: 30 U/L (ref 10–52)
ANION GAP SERPL CALC-SCNC: 15 MMOL/L (ref 10–20)
AST SERPL W P-5'-P-CCNC: 19 U/L (ref 9–39)
BILIRUB SERPL-MCNC: 0.7 MG/DL (ref 0–1.2)
BUN SERPL-MCNC: 14 MG/DL (ref 6–23)
CALCIUM SERPL-MCNC: 9.1 MG/DL (ref 8.6–10.6)
CHLORIDE SERPL-SCNC: 101 MMOL/L (ref 98–107)
CHOLEST SERPL-MCNC: 205 MG/DL (ref 0–199)
CHOLESTEROL/HDL RATIO: 3.7
CO2 SERPL-SCNC: 26 MMOL/L (ref 21–32)
CREAT SERPL-MCNC: 0.95 MG/DL (ref 0.5–1.3)
CREAT UR-MCNC: 130.4 MG/DL (ref 20–370)
EGFRCR SERPLBLD CKD-EPI 2021: >90 ML/MIN/1.73M*2
ERYTHROCYTE [DISTWIDTH] IN BLOOD BY AUTOMATED COUNT: 11.8 % (ref 11.5–14.5)
EST. AVERAGE GLUCOSE BLD GHB EST-MCNC: 146 MG/DL
GLUCOSE SERPL-MCNC: 147 MG/DL (ref 74–99)
HBA1C MFR BLD: 6.7 %
HCT VFR BLD AUTO: 46.5 % (ref 41–52)
HDLC SERPL-MCNC: 56.1 MG/DL
HGB BLD-MCNC: 16.4 G/DL (ref 13.5–17.5)
LDLC SERPL CALC-MCNC: 114 MG/DL
MAGNESIUM SERPL-MCNC: 1.75 MG/DL (ref 1.6–2.4)
MCH RBC QN AUTO: 31.2 PG (ref 26–34)
MCHC RBC AUTO-ENTMCNC: 35.3 G/DL (ref 32–36)
MCV RBC AUTO: 88 FL (ref 80–100)
NON HDL CHOLESTEROL: 149 MG/DL (ref 0–149)
NRBC BLD-RTO: 0 /100 WBCS (ref 0–0)
PHOSPHATE SERPL-MCNC: 2.9 MG/DL (ref 2.5–4.9)
PLATELET # BLD AUTO: 244 X10*3/UL (ref 150–450)
POTASSIUM SERPL-SCNC: 4.1 MMOL/L (ref 3.5–5.3)
PROT SERPL-MCNC: 6.7 G/DL (ref 6.4–8.2)
PROT UR-ACNC: 19 MG/DL (ref 5–25)
PROT/CREAT UR: 0.15 MG/MG CREAT (ref 0–0.17)
PTH-INTACT SERPL-MCNC: 99.6 PG/ML (ref 18.5–88)
RBC # BLD AUTO: 5.26 X10*6/UL (ref 4.5–5.9)
SODIUM SERPL-SCNC: 138 MMOL/L (ref 136–145)
TACROLIMUS BLD-MCNC: 6.3 NG/ML
TESTOST SERPL-MCNC: 324 NG/DL (ref 240–1000)
TRIGL SERPL-MCNC: 173 MG/DL (ref 0–149)
VLDL: 35 MG/DL (ref 0–40)
WBC # BLD AUTO: 6.7 X10*3/UL (ref 4.4–11.3)

## 2024-05-06 PROCEDURE — 82306 VITAMIN D 25 HYDROXY: CPT

## 2024-05-06 PROCEDURE — 83970 ASSAY OF PARATHORMONE: CPT

## 2024-05-06 PROCEDURE — 84156 ASSAY OF PROTEIN URINE: CPT

## 2024-05-06 PROCEDURE — 82570 ASSAY OF URINE CREATININE: CPT

## 2024-05-06 PROCEDURE — 80061 LIPID PANEL: CPT

## 2024-05-06 PROCEDURE — 84100 ASSAY OF PHOSPHORUS: CPT

## 2024-05-06 PROCEDURE — 84403 ASSAY OF TOTAL TESTOSTERONE: CPT

## 2024-05-06 PROCEDURE — 36415 COLL VENOUS BLD VENIPUNCTURE: CPT

## 2024-05-06 PROCEDURE — 83735 ASSAY OF MAGNESIUM: CPT

## 2024-05-06 PROCEDURE — 85027 COMPLETE CBC AUTOMATED: CPT

## 2024-05-06 PROCEDURE — 80197 ASSAY OF TACROLIMUS: CPT

## 2024-05-06 PROCEDURE — 80053 COMPREHEN METABOLIC PANEL: CPT

## 2024-05-06 PROCEDURE — 83036 HEMOGLOBIN GLYCOSYLATED A1C: CPT

## 2024-05-09 ENCOUNTER — TELEPHONE (OUTPATIENT)
Dept: TRANSPLANT | Facility: HOSPITAL | Age: 49
End: 2024-05-09
Payer: COMMERCIAL

## 2024-05-09 DIAGNOSIS — I25.10 CORONARY ARTERY DISEASE INVOLVING NATIVE CORONARY ARTERY OF NATIVE HEART WITHOUT ANGINA PECTORIS: ICD-10-CM

## 2024-05-09 DIAGNOSIS — I10 ESSENTIAL HYPERTENSION: ICD-10-CM

## 2024-05-09 RX ORDER — PRAVASTATIN SODIUM 40 MG/1
80 TABLET ORAL NIGHTLY
Qty: 90 TABLET | Refills: 3 | Status: SHIPPED | OUTPATIENT
Start: 2024-05-09 | End: 2024-05-09 | Stop reason: SDUPTHER

## 2024-05-09 RX ORDER — PRAVASTATIN SODIUM 80 MG/1
80 TABLET ORAL NIGHTLY
Qty: 90 TABLET | Refills: 3 | Status: SHIPPED | OUTPATIENT
Start: 2024-05-09 | End: 2025-05-09

## 2024-05-13 ENCOUNTER — TELEPHONE (OUTPATIENT)
Dept: TRANSPLANT | Facility: HOSPITAL | Age: 49
End: 2024-05-13
Payer: COMMERCIAL

## 2024-05-13 NOTE — TELEPHONE ENCOUNTER
Spoke with patient regarding his BP being elevated.  PT states his BP has been running 165/88 HR of 89.  Started on carvedilol on 3/27.  Will review with doctor.

## 2024-05-14 ENCOUNTER — TELEPHONE (OUTPATIENT)
Dept: TRANSPLANT | Facility: HOSPITAL | Age: 49
End: 2024-05-14
Payer: COMMERCIAL

## 2024-05-14 DIAGNOSIS — I10 ESSENTIAL HYPERTENSION: ICD-10-CM

## 2024-05-14 RX ORDER — CARVEDILOL 12.5 MG/1
12.5 TABLET ORAL
Qty: 180 TABLET | Refills: 3 | Status: SHIPPED | OUTPATIENT
Start: 2024-05-14 | End: 2025-05-14

## 2024-05-14 NOTE — TELEPHONE ENCOUNTER
Reviewed with Dr. Leavitt.  With BP still running high,  PLAN increase carvedilol to 12.5 mg BID  Called and discussed plan with patient.  No further questions.

## 2024-06-26 ENCOUNTER — TELEPHONE (OUTPATIENT)
Dept: TRANSPLANT | Facility: HOSPITAL | Age: 49
End: 2024-06-26
Payer: COMMERCIAL

## 2024-06-26 NOTE — TELEPHONE ENCOUNTER
Reviewed pred script in EPIC.  Med was sent to [facility] in [place] on 2/28/24. Called to confirm with [facility] that they have current script and will start the fill.  Pt called and message left.

## 2025-01-29 ENCOUNTER — LAB (OUTPATIENT)
Dept: LAB | Facility: HOSPITAL | Age: 50
End: 2025-01-29
Payer: COMMERCIAL

## 2025-01-29 ENCOUNTER — APPOINTMENT (OUTPATIENT)
Dept: LAB | Facility: HOSPITAL | Age: 50
End: 2025-01-29
Payer: COMMERCIAL

## 2025-01-29 DIAGNOSIS — I10 ESSENTIAL HYPERTENSION: ICD-10-CM

## 2025-01-29 DIAGNOSIS — E21.3 HYPERPARATHYROIDISM (MULTI): ICD-10-CM

## 2025-01-29 DIAGNOSIS — E55.9 VITAMIN D DEFICIENCY: ICD-10-CM

## 2025-01-29 DIAGNOSIS — Z94.0 KIDNEY REPLACED BY TRANSPLANT (HHS-HCC): ICD-10-CM

## 2025-01-29 LAB
25(OH)D3 SERPL-MCNC: 34 NG/ML (ref 30–100)
ALBUMIN SERPL BCP-MCNC: 4.5 G/DL (ref 3.4–5)
ANION GAP SERPL CALC-SCNC: 16 MMOL/L (ref 10–20)
BUN SERPL-MCNC: 16 MG/DL (ref 6–23)
CALCIUM SERPL-MCNC: 10 MG/DL (ref 8.6–10.6)
CHLORIDE SERPL-SCNC: 103 MMOL/L (ref 98–107)
CO2 SERPL-SCNC: 26 MMOL/L (ref 21–32)
CREAT SERPL-MCNC: 1.11 MG/DL (ref 0.5–1.3)
CREAT UR-MCNC: 126.6 MG/DL (ref 20–370)
EGFRCR SERPLBLD CKD-EPI 2021: 80 ML/MIN/1.73M*2
ERYTHROCYTE [DISTWIDTH] IN BLOOD BY AUTOMATED COUNT: 12.5 % (ref 11.5–14.5)
GLUCOSE SERPL-MCNC: 112 MG/DL (ref 74–99)
HCT VFR BLD AUTO: 52.2 % (ref 41–52)
HGB BLD-MCNC: 16.5 G/DL (ref 13.5–17.5)
HOLD SPECIMEN: NORMAL
MAGNESIUM SERPL-MCNC: 2.26 MG/DL (ref 1.6–2.4)
MCH RBC QN AUTO: 28.8 PG (ref 26–34)
MCHC RBC AUTO-ENTMCNC: 31.6 G/DL (ref 32–36)
MCV RBC AUTO: 91 FL (ref 80–100)
NRBC BLD-RTO: 0 /100 WBCS (ref 0–0)
PHOSPHATE SERPL-MCNC: 4.1 MG/DL (ref 2.5–4.9)
PLATELET # BLD AUTO: 271 X10*3/UL (ref 150–450)
POTASSIUM SERPL-SCNC: 4.5 MMOL/L (ref 3.5–5.3)
PROT UR-ACNC: 14 MG/DL (ref 5–25)
PROT/CREAT UR: 0.11 MG/MG CREAT (ref 0–0.17)
PTH-INTACT SERPL-MCNC: 57 PG/ML (ref 18.5–88)
RBC # BLD AUTO: 5.73 X10*6/UL (ref 4.5–5.9)
SODIUM SERPL-SCNC: 140 MMOL/L (ref 136–145)
TACROLIMUS BLD-MCNC: 4.7 NG/ML
WBC # BLD AUTO: 5.6 X10*3/UL (ref 4.4–11.3)

## 2025-01-29 PROCEDURE — 82570 ASSAY OF URINE CREATININE: CPT

## 2025-01-29 PROCEDURE — 80197 ASSAY OF TACROLIMUS: CPT

## 2025-01-29 PROCEDURE — 83735 ASSAY OF MAGNESIUM: CPT

## 2025-01-29 PROCEDURE — 84156 ASSAY OF PROTEIN URINE: CPT

## 2025-01-29 PROCEDURE — 83970 ASSAY OF PARATHORMONE: CPT

## 2025-01-29 PROCEDURE — 80069 RENAL FUNCTION PANEL: CPT

## 2025-01-29 PROCEDURE — 82306 VITAMIN D 25 HYDROXY: CPT

## 2025-01-29 PROCEDURE — 85027 COMPLETE CBC AUTOMATED: CPT

## 2025-01-30 LAB — TACROLIMUS BLD-MCNC: 4.4 NG/ML

## 2025-02-07 ENCOUNTER — TELEPHONE (OUTPATIENT)
Facility: HOSPITAL | Age: 50
End: 2025-02-07

## 2025-02-07 NOTE — TELEPHONE ENCOUNTER
Patient called requesting to speak with coordinator about his next appointment. I called and let him know that he is scheduled for 02/19/25 at 8 am   No further action need

## 2025-02-19 ENCOUNTER — OFFICE VISIT (OUTPATIENT)
Dept: TRANSPLANT | Facility: CLINIC | Age: 50
End: 2025-02-19
Payer: COMMERCIAL

## 2025-02-19 VITALS
TEMPERATURE: 97.6 F | HEART RATE: 78 BPM | RESPIRATION RATE: 18 BRPM | BODY MASS INDEX: 28.55 KG/M2 | DIASTOLIC BLOOD PRESSURE: 88 MMHG | OXYGEN SATURATION: 97 % | HEIGHT: 73 IN | SYSTOLIC BLOOD PRESSURE: 138 MMHG | WEIGHT: 215.4 LBS

## 2025-02-19 DIAGNOSIS — Z94.0 KIDNEY REPLACED BY TRANSPLANT (HHS-HCC): Primary | ICD-10-CM

## 2025-02-19 DIAGNOSIS — Z79.899 ENCOUNTER FOR LONG-TERM (CURRENT) USE OF HIGH-RISK MEDICATION: ICD-10-CM

## 2025-02-19 DIAGNOSIS — I15.1 HYPERTENSION SECONDARY TO OTHER RENAL DISORDERS: ICD-10-CM

## 2025-02-19 DIAGNOSIS — Z48.298 AFTERCARE FOLLOWING ORGAN TRANSPLANT: ICD-10-CM

## 2025-02-19 PROCEDURE — 99215 OFFICE O/P EST HI 40 MIN: CPT

## 2025-02-19 PROCEDURE — 3008F BODY MASS INDEX DOCD: CPT | Performed by: INTERNAL MEDICINE

## 2025-02-19 PROCEDURE — 3079F DIAST BP 80-89 MM HG: CPT | Performed by: INTERNAL MEDICINE

## 2025-02-19 PROCEDURE — 3075F SYST BP GE 130 - 139MM HG: CPT | Performed by: INTERNAL MEDICINE

## 2025-02-19 PROCEDURE — 99215 OFFICE O/P EST HI 40 MIN: CPT | Mod: AF

## 2025-02-19 SDOH — ECONOMIC STABILITY: FOOD INSECURITY: WITHIN THE PAST 12 MONTHS, YOU WORRIED THAT YOUR FOOD WOULD RUN OUT BEFORE YOU GOT MONEY TO BUY MORE.: NEVER TRUE

## 2025-02-19 SDOH — ECONOMIC STABILITY: INCOME INSECURITY: IN THE LAST 12 MONTHS, WAS THERE A TIME WHEN YOU WERE NOT ABLE TO PAY THE MORTGAGE OR RENT ON TIME?: NO

## 2025-02-19 SDOH — ECONOMIC STABILITY: FOOD INSECURITY: WITHIN THE PAST 12 MONTHS, THE FOOD YOU BOUGHT JUST DIDN'T LAST AND YOU DIDN'T HAVE MONEY TO GET MORE.: NEVER TRUE

## 2025-02-19 SDOH — ECONOMIC STABILITY: GENERAL
WHICH OF THE FOLLOWING DO YOU KNOW HOW TO USE AND HAVE ACCESS TO EVERY DAY? (CHOOSE ALL THAT APPLY): DESKTOP COMPUTER, LAPTOP COMPUTER, OR TABLET WITH BROADBAND INTERNET CONNECTION

## 2025-02-19 SDOH — ECONOMIC STABILITY: TRANSPORTATION INSECURITY
IN THE PAST 12 MONTHS, HAS LACK OF TRANSPORTATION KEPT YOU FROM MEETINGS, WORK, OR FROM GETTING THINGS NEEDED FOR DAILY LIVING?: NO

## 2025-02-19 SDOH — ECONOMIC STABILITY: TRANSPORTATION INSECURITY
IN THE PAST 12 MONTHS, HAS THE LACK OF TRANSPORTATION KEPT YOU FROM MEDICAL APPOINTMENTS OR FROM GETTING MEDICATIONS?: NO

## 2025-02-19 ASSESSMENT — SOCIAL DETERMINANTS OF HEALTH (SDOH)
WITHIN THE LAST YEAR, HAVE YOU BEEN AFRAID OF YOUR PARTNER OR EX-PARTNER?: NO
WITHIN THE LAST YEAR, HAVE TO BEEN RAPED OR FORCED TO HAVE ANY KIND OF SEXUAL ACTIVITY BY YOUR PARTNER OR EX-PARTNER?: NO
HOW HARD IS IT FOR YOU TO PAY FOR THE VERY BASICS LIKE FOOD, HOUSING, MEDICAL CARE, AND HEATING?: NOT HARD AT ALL
WITHIN THE LAST YEAR, HAVE YOU BEEN KICKED, HIT, SLAPPED, OR OTHERWISE PHYSICALLY HURT BY YOUR PARTNER OR EX-PARTNER?: NO
WITHIN THE LAST YEAR, HAVE YOU BEEN HUMILIATED OR EMOTIONALLY ABUSED IN OTHER WAYS BY YOUR PARTNER OR EX-PARTNER?: NO

## 2025-02-19 ASSESSMENT — PATIENT HEALTH QUESTIONNAIRE - PHQ9
1. LITTLE INTEREST OR PLEASURE IN DOING THINGS: NOT AT ALL
SUM OF ALL RESPONSES TO PHQ9 QUESTIONS 1 & 2: 0
2. FEELING DOWN, DEPRESSED OR HOPELESS: NOT AT ALL

## 2025-02-19 ASSESSMENT — PAIN SCALES - GENERAL: PAINLEVEL_OUTOF10: 0-NO PAIN

## 2025-02-19 NOTE — PROGRESS NOTES
TRANSPLANT NEPHROLOGY :   OUTPATIENT CLINIC NOTE      SERVICE DATE : 02/19/2025     REASON FOR VISIT/CHIEF COMPLAINT:  S/P  TRANSPLANT SURGERY  IMMUNOSUPPRESSIVE MEDICATION MANAGEMENT  BLOOD PRESSURE MANAGEMENT    HPI:    Mr. Vines is a 49 y.o. male with past medical history significant for  hypertension, hyperlipiemia, diverticular disease and End Stage Renal Disease secondary to APKD who underwent a living related renal transplant on 12/6/2001. Donor was his brother, identical match. Patient is here to follow up S/P kidney transplant. Pt used to follow with Dr. Braun who retired and he established care with  [facility].      Patient had COVID in 11/2021 and was diagnosed with testicular cancer in 12/2020 s/p surgery.     Last seen 2/2024. Here for follow up.    Follows with cardiology for aortic root dilatation and LV trabeculosis. Started on carvedilol 6.25 mg bid. Further eval after better BP control achieved.    Recent Bps 130/80s. Lost approx 20 lb wt with diet and exercise.    Feels well overall.     Denied chest pain, SOB, GAYTAN, Palpitation. Normal urination and bowel movement. Normal gait and no weakness of arms/legs. No cough, runny nose, sore throat, cold symptoms, or rash. No hearing loss. Normal vision.No problems with his sleep, mood and function. No recent infection, hospitalization, surgery or ER visits.      ROS:  Review of  14 systems was performed system by system. See HPI. Otherwise, the symptoms were negative.    PAST MEDICAL HISTORY:  No past medical history on file.     PAST SURGICAL HISTORY:  No past surgical history on file.     SOCIAL HISTORY:  Social History     Socioeconomic History    Marital status:      Spouse name: Not on file    Number of children: Not on file    Years of education: Not on file    Highest education level: Not on file   Occupational History    Not on file   Tobacco Use    Smoking status: Never    Smokeless tobacco: Former     Types: Chew  "  Substance and Sexual Activity    Alcohol use: Not on file    Drug use: Not on file    Sexual activity: Not on file   Other Topics Concern    Not on file   Social History Narrative    Not on file     Social Drivers of Health     Financial Resource Strain: Not on file   Food Insecurity: Not on file   Transportation Needs: Not on file   Physical Activity: Not on file   Stress: Not on file   Social Connections: Not on file   Intimate Partner Violence: Not on file   Housing Stability: Not on file       FAMILY HISTORY:  No family history on file.    MEDICATION LIST:  Current Outpatient Medications   Medication Instructions    amLODIPine (NORVASC) 10 mg, oral, Daily    aspirin 81 mg, oral, Daily    carvedilol (COREG) 12.5 mg, oral, 2 times daily (morning and late afternoon)    multivitamin tablet 1 tablet, oral, Daily    mycophenolate (CELLCEPT) 1,000 mg, oral, Every 12 hours scheduled    pravastatin (PRAVACHOL) 80 mg, oral, Nightly    predniSONE (DELTASONE) 5 mg, oral, Daily    tacrolimus (PROGRAF) 0.5 mg, oral, Every 12 hours, Total dose of 2.5mg Q 12 hrs.    tacrolimus (PROGRAF) 2 mg, oral, Every 12 hours       ALLERGY  No Known Allergies    PHYSICAL EXAM:    Visit Vitals  /88 (BP Location: Right arm, Patient Position: Sitting, BP Cuff Size: Large adult)   Pulse 78   Temp 36.4 °C (97.6 °F) (Temporal)   Resp 18   Ht 1.854 m (6' 1\")   Wt 97.7 kg (215 lb 6.4 oz)   SpO2 97%   BMI 28.42 kg/m²   Smoking Status Never   BSA 2.24 m²         General Appearance - NAD, A&Ox3   HEENT - Supple. Not pale. No jaundice. No JVD or LAD  CVS - RRR. Normal S1/S2. No murmur, rub or gallop  Lungs- clear to auscultation bilaterally  Abdomen - soft , NT, ND, no guarding. No hepatosplenomegaly. No allograft tenderness  Musculoskeletal /Extremities - no edema. Full ROM. No joint tenderness.   Neuro/Psych - appropriate mood and affect. Motor power V/V all extremities. CN I -XII were grossly intact.  Skin - No visible rash      LABS:    Lab " "Results   Component Value Date    CREATININE 1.11 01/29/2025    BUN 16 01/29/2025     01/29/2025    K 4.5 01/29/2025     01/29/2025    CO2 26 01/29/2025     Lab Results   Component Value Date    PTH 57.0 01/29/2025    CALCIUM 10.0 01/29/2025    PHOS 4.1 01/29/2025     Lab Results   Component Value Date    WBC 5.6 01/29/2025    HGB 16.5 01/29/2025    HCT 52.2 (H) 01/29/2025    MCV 91 01/29/2025     01/29/2025     No results found for: \"IRON\", \"TIBC\", \"FERRITIN\"  Lab Results   Component Value Date    TACROLIMUS 4.7 01/29/2025    TACROLIMUS 4.4 01/29/2025     Lab Results   Component Value Date    CMVDNAPCR Not Detected 02/26/2024     CT abd/pelvis: 12/2024  1.  No acute abdominopelvic process identified.   2.  No evidence of mass or abdominal pelvic adenopathy.   3.  Bilateral atrophic native kidneys with innumerable cysts, status post right lower quadrant renal transplant. In conjunction with innumerable hepatic cysts, findings may reflect sequela of polycystic kidney disease in the appropriate context.   4.  Diverticulosis without evidence of diverticulitis.   5.  Other chronic findings as discussed.     TTE: 3/2024  1. Poorly visualized anatomical structures due to suboptimal image quality.   2. Left ventricular systolic function is low normal with a 50-55% estimated ejection fraction.   3. There is mildly reduced right ventricular systolic function.   4. No prior echocardiogram available for comparison.   5. Trabeculated LV apex.   6. Ordering MD notified of findings at the time of reporting.        ASSESSMENT AND PLAN:    Mr. Vines is a 49 y.o. male  who is here for follow up s/p kidney transplant.    TRANSPLANT DATE: 12/6/2001 (Kidney)      1. ESRD S/P kidney transplant   - Creatinine last check was 1.1, stable allograft function  -Random urine protein/creatinine ratio is 110 mg/g, stable  -Ensure adequate hydration  - Avoid nephrotoxic medications, NSAIDs, and IV contrast.    2. " Immunosuppression  -Tacrolimus level last check was 4.7, acceptable. Goal 4-6. Cont current dose tac 2.5 mg q12.  -Continue MMF 1000 mg q12, Pred 5 mg/d     3. Electrolytes  -Acceptable from last lab drawn    4. Hypertension  -recent home BPs improved. No hypervolemia on exam  -advised to bring home BP log to next appt.   -Continue current anti hypertensive medication    5. Bone Mineral Disease/Osteoporosis  - Ca, phos acceptable. PTH, vit D acceptable 1/2025  - Consider DEXA every 2-3 years , defer to PCP    6.Anemia/Leukopenia:  - Hb 16.5, acceptable  - WBC stable    7.Health maintenance and vaccination  - Flu shot during flu season annually  - Cancer screening is up to date per the patient    Lab : Routine transplant lab ( CBC, RFP, and anti-rejection trough level ) every 3 months  Additional labs:  VIT D, PTH Q3 months  Viral screening PCR, Allosure and UPC per protocol.    Additional Plan :  - follo with cardiology as scheduled    RTC 12 month(s)

## 2025-02-24 DIAGNOSIS — Z94.0 KIDNEY REPLACED BY TRANSPLANT (HHS-HCC): ICD-10-CM

## 2025-02-26 RX ORDER — AMLODIPINE BESYLATE 10 MG/1
10 TABLET ORAL DAILY
Qty: 90 TABLET | Refills: 3 | Status: SHIPPED | OUTPATIENT
Start: 2025-02-26

## 2025-03-17 ENCOUNTER — TELEPHONE (OUTPATIENT)
Facility: HOSPITAL | Age: 50
End: 2025-03-17
Payer: COMMERCIAL

## 2025-03-17 DIAGNOSIS — I10 ESSENTIAL HYPERTENSION: ICD-10-CM

## 2025-03-17 RX ORDER — CARVEDILOL 12.5 MG/1
12.5 TABLET ORAL
Qty: 180 TABLET | Refills: 3 | Status: SHIPPED | OUTPATIENT
Start: 2025-03-17 | End: 2026-03-17

## 2025-03-17 NOTE — TELEPHONE ENCOUNTER
Patient called to request refills of the below medication(s) and dose(s).  Please send prescription for the checked items below to       [id number] - [place], [place] - [address] AT [facility] [address] [place] [address]  Phone: [phone] Fax: [phone]       carvedilol (Coreg) 12.5 mg tablet Take 1 tablet (12.5 mg) by mouth 2 times daily (morning and late afternoon).

## 2025-03-19 ENCOUNTER — TELEPHONE (OUTPATIENT)
Facility: HOSPITAL | Age: 50
End: 2025-03-19
Payer: COMMERCIAL

## 2025-03-19 DIAGNOSIS — Z94.0 KIDNEY REPLACED BY TRANSPLANT (HHS-HCC): ICD-10-CM

## 2025-03-19 RX ORDER — TACROLIMUS 0.5 MG/1
CAPSULE ORAL
Qty: 60 CAPSULE | Refills: 1 | Status: SHIPPED | OUTPATIENT
Start: 2025-03-19 | End: 2025-03-20 | Stop reason: SDUPTHER

## 2025-03-19 RX ORDER — MYCOPHENOLATE MOFETIL 500 MG/1
1000 TABLET ORAL EVERY 12 HOURS SCHEDULED
Qty: 120 TABLET | Refills: 1 | Status: SHIPPED | OUTPATIENT
Start: 2025-03-19 | End: 2025-03-20 | Stop reason: SDUPTHER

## 2025-03-19 RX ORDER — TACROLIMUS 1 MG/1
2 CAPSULE ORAL EVERY 12 HOURS
Qty: 120 CAPSULE | Refills: 1 | Status: SHIPPED | OUTPATIENT
Start: 2025-03-19 | End: 2025-03-20 | Stop reason: SDUPTHER

## 2025-03-19 NOTE — TELEPHONE ENCOUNTER
Patient called requesting refill for prescription.  Patient's demographics verified, including name d.o.b address and phone number.  Patient called requesting a refill of a prescription of   Mycophenolate 250  Tac 1 mg and 1/2 mg   Patient has enough medication for 0 more days  Patient is requesting prescription be sent to    [facility] [address] [facility]   Pharmacy  Phone number is [phone]   Patient's call back number is [phone].

## 2025-03-20 RX ORDER — TACROLIMUS 1 MG/1
2 CAPSULE ORAL EVERY 12 HOURS
Qty: 120 CAPSULE | Refills: 11 | Status: SHIPPED | OUTPATIENT
Start: 2025-03-20 | End: 2026-03-20

## 2025-03-20 RX ORDER — TACROLIMUS 0.5 MG/1
0.5 CAPSULE ORAL 2 TIMES DAILY
Qty: 60 CAPSULE | Refills: 11 | Status: SHIPPED | OUTPATIENT
Start: 2025-03-20 | End: 2026-03-20

## 2025-03-20 RX ORDER — MYCOPHENOLATE MOFETIL 500 MG/1
1000 TABLET ORAL EVERY 12 HOURS SCHEDULED
Qty: 120 TABLET | Refills: 11 | Status: SHIPPED | OUTPATIENT
Start: 2025-03-20 | End: 2026-03-20

## 2025-05-30 DIAGNOSIS — Z94.0 KIDNEY REPLACED BY TRANSPLANT (HHS-HCC): ICD-10-CM

## 2025-06-03 ENCOUNTER — TELEPHONE (OUTPATIENT)
Facility: HOSPITAL | Age: 50
End: 2025-06-03
Payer: COMMERCIAL

## 2025-06-03 DIAGNOSIS — Z94.0 KIDNEY REPLACED BY TRANSPLANT (HHS-HCC): ICD-10-CM

## 2025-06-03 DIAGNOSIS — I10 HYPERTENSION, UNSPECIFIED TYPE: ICD-10-CM

## 2025-06-03 RX ORDER — AMLODIPINE BESYLATE 10 MG/1
10 TABLET ORAL DAILY
Qty: 90 TABLET | Refills: 3 | Status: SHIPPED | OUTPATIENT
Start: 2025-06-03 | End: 2026-06-03

## 2025-06-03 NOTE — TELEPHONE ENCOUNTER
Patient called to request refills of the below medication(s) and dose(s).  Please send prescription for the checked items below to       [id number] - [place], [place] - [address] [facility] & [facility]  [address]  [place] [address]  Phone: [phone] Fax: [phone]         amLODIPine (Norvasc) 10 mg tablet

## 2025-06-06 RX ORDER — TACROLIMUS 0.5 MG/1
CAPSULE ORAL
Qty: 60 CAPSULE | Refills: 3 | Status: SHIPPED | OUTPATIENT
Start: 2025-06-06 | End: 2026-06-06

## 2025-06-06 RX ORDER — TACROLIMUS 1 MG/1
2 CAPSULE ORAL EVERY 12 HOURS
Qty: 120 CAPSULE | Refills: 3 | Status: SHIPPED | OUTPATIENT
Start: 2025-06-06 | End: 2026-06-06

## 2025-06-06 RX ORDER — MYCOPHENOLATE MOFETIL 500 MG/1
1000 TABLET ORAL EVERY 12 HOURS SCHEDULED
Qty: 120 TABLET | Refills: 3 | Status: SHIPPED | OUTPATIENT
Start: 2025-06-06 | End: 2026-06-06

## 2025-06-27 ENCOUNTER — TELEPHONE (OUTPATIENT)
Facility: HOSPITAL | Age: 50
End: 2025-06-27
Payer: COMMERCIAL

## 2025-06-27 DIAGNOSIS — Z94.0 KIDNEY REPLACED BY TRANSPLANT (HHS-HCC): ICD-10-CM

## 2025-06-27 RX ORDER — PREDNISONE 5 MG/1
5 TABLET ORAL DAILY
Qty: 10 TABLET | Refills: 0 | Status: SHIPPED | OUTPATIENT
Start: 2025-06-27 | End: 2025-07-07

## 2025-06-27 NOTE — TELEPHONE ENCOUNTER
Patient called requesting refill for prescription.  Patient's demographics verified, including name d.o.b address and phone number.  Patient called requesting a refill of a prescription of   Prednisone 5 mg  Patient has enough medication for 0 more days.  Patient is requesting prescription be sent to    [facility] #[id number] - [place], [place] - [address] AT [facility] & [facility]    Pharmacy Phone number is   [phone]   Patient's call back number is   [phone]

## 2025-07-09 ENCOUNTER — TELEPHONE (OUTPATIENT)
Facility: HOSPITAL | Age: 50
End: 2025-07-09
Payer: COMMERCIAL

## 2025-07-09 DIAGNOSIS — Z94.0 KIDNEY REPLACED BY TRANSPLANT (HHS-HCC): ICD-10-CM

## 2025-07-09 RX ORDER — PREDNISONE 5 MG/1
5 TABLET ORAL DAILY
Qty: 90 TABLET | Refills: 3 | Status: SHIPPED | OUTPATIENT
Start: 2025-07-09 | End: 2026-07-09

## 2025-07-09 NOTE — TELEPHONE ENCOUNTER
Patient called to request refills of the below medication(s) and dose(s).  Please send prescription for the checked items below to       [id number] - [place], [place] - [address] [facility] & [address]  [place] [address]  Phone: [phone] Fax: [phone]         predniSONE (Deltasone) 5 mg tablet